# Patient Record
Sex: FEMALE | Race: WHITE | NOT HISPANIC OR LATINO | Employment: FULL TIME | URBAN - NONMETROPOLITAN AREA
[De-identification: names, ages, dates, MRNs, and addresses within clinical notes are randomized per-mention and may not be internally consistent; named-entity substitution may affect disease eponyms.]

---

## 2017-11-20 ENCOUNTER — TRANSCRIBE ORDERS (OUTPATIENT)
Dept: ADMINISTRATIVE | Facility: HOSPITAL | Age: 42
End: 2017-11-20

## 2017-11-20 DIAGNOSIS — Z12.31 VISIT FOR SCREENING MAMMOGRAM: Primary | ICD-10-CM

## 2017-12-13 ENCOUNTER — HOSPITAL ENCOUNTER (OUTPATIENT)
Dept: MAMMOGRAPHY | Facility: HOSPITAL | Age: 42
Discharge: HOME | End: 2017-12-13
Admitting: NURSE PRACTITIONER

## 2017-12-13 DIAGNOSIS — Z12.31 VISIT FOR SCREENING MAMMOGRAM: ICD-10-CM

## 2017-12-13 PROCEDURE — 77067 SCR MAMMO BI INCL CAD: CPT | Performed by: RADIOLOGY

## 2017-12-13 PROCEDURE — 77063 BREAST TOMOSYNTHESIS BI: CPT | Performed by: RADIOLOGY

## 2017-12-13 PROCEDURE — G0202 SCR MAMMO BI INCL CAD: HCPCS

## 2017-12-13 PROCEDURE — 77063 BREAST TOMOSYNTHESIS BI: CPT

## 2017-12-14 ENCOUNTER — HOSPITAL ENCOUNTER (OUTPATIENT)
Dept: MAMMOGRAPHY | Facility: HOSPITAL | Age: 42
Discharge: HOME | End: 2017-12-14

## 2018-01-02 ENCOUNTER — HOSPITAL ENCOUNTER (OUTPATIENT)
Dept: ULTRASOUND IMAGING | Facility: HOSPITAL | Age: 43
Discharge: HOME | End: 2018-01-02
Admitting: NURSE PRACTITIONER

## 2018-01-02 DIAGNOSIS — R92.8 ABNORMAL MAMMOGRAM OF RIGHT BREAST: ICD-10-CM

## 2018-01-02 PROCEDURE — 76642 ULTRASOUND BREAST LIMITED: CPT | Performed by: RADIOLOGY

## 2018-01-02 PROCEDURE — 76642 ULTRASOUND BREAST LIMITED: CPT

## 2018-07-03 ENCOUNTER — HOSPITAL ENCOUNTER (OUTPATIENT)
Dept: MAMMOGRAPHY | Facility: HOSPITAL | Age: 43
Discharge: HOME | End: 2018-07-03
Admitting: RADIOLOGY

## 2018-07-03 DIAGNOSIS — Z09 FOLLOW-UP EXAM, 3-6 MONTHS SINCE PREVIOUS EXAM: ICD-10-CM

## 2018-07-03 PROCEDURE — 77065 DX MAMMO INCL CAD UNI: CPT

## 2018-07-03 PROCEDURE — G0279 TOMOSYNTHESIS, MAMMO: HCPCS

## 2018-07-03 PROCEDURE — 77065 DX MAMMO INCL CAD UNI: CPT | Performed by: RADIOLOGY

## 2018-07-03 PROCEDURE — 77061 BREAST TOMOSYNTHESIS UNI: CPT | Performed by: RADIOLOGY

## 2018-07-11 ENCOUNTER — OFFICE VISIT (OUTPATIENT)
Dept: SURGERY | Facility: CLINIC | Age: 43
End: 2018-07-11

## 2018-07-11 VITALS
HEIGHT: 63 IN | DIASTOLIC BLOOD PRESSURE: 81 MMHG | HEART RATE: 66 BPM | WEIGHT: 121.4 LBS | SYSTOLIC BLOOD PRESSURE: 118 MMHG | BODY MASS INDEX: 21.51 KG/M2

## 2018-07-11 DIAGNOSIS — N63.0 BREAST MASS: Primary | ICD-10-CM

## 2018-07-11 DIAGNOSIS — Z80.3 FAMILY HISTORY OF BREAST CANCER: ICD-10-CM

## 2018-07-11 DIAGNOSIS — N64.4 BREAST PAIN: ICD-10-CM

## 2018-07-11 PROCEDURE — 76642 ULTRASOUND BREAST LIMITED: CPT | Performed by: SURGERY

## 2018-07-11 PROCEDURE — 99203 OFFICE O/P NEW LOW 30 MIN: CPT | Performed by: SURGERY

## 2018-07-11 NOTE — PROGRESS NOTES
"Subjective   Radha Walker is a 43 y.o. female here today for spot in right breast and under arm [facility].    History of Present Illness  Ms. Walker was seen in the office today for breast pain and an abnormal mammogram of the right breast demonstrating a stable breast mass.  This is a 43-year-old female who presents with a six-month history of localized right breast soreness.  She does not appreciate a definite mass and she denies nipple discharge.  Workup included a diagnostic right breast mammogram which demonstrated benign appearing mass in the upper outer quadrant which on ultrasound appeared to be a lymph node.  The patient denies a prior history of breast biopsy or cyst aspiration.  As far as risk factors go, Radha was 16 at the time that she had her first child, with an onset of menses at age 14.  Family history is positive for breast cancer the patient's mother in her 30s and a paternal aunt with breast cancer in her 30s.  The patient was postmenopausal in 2015 and has never been on hormone replacement therapy.  Allergies   Allergen Reactions   • Rocephin [Ceftriaxone] Swelling   • Penicillins Rash   • Red Dye Rash     No current outpatient prescriptions on file.     No current facility-administered medications for this visit.      History reviewed. No pertinent past medical history.  Past Surgical History:   Procedure Laterality Date   • HYSTERECTOMY       Review of Systems  General: fever  Integumentary: negative  Eyes: discharge from eyes  ENT: hoarseness  Respiratory: negative  Gastrointestinal: negative  Cardiovascular: negative  Neurological: numbness  Psychiatric: anxiety and mood swings  Hematologic/Lymphatic: easy bruising  Genitourinary: negative  Musculoskeletal: sore muscles  Endocrine: hot flashes  Breasts: breast pain        Objective   /81 (BP Location: Left arm, Patient Position: Sitting)   Pulse 66   Ht 160 cm (63\")   Wt 55.1 kg (121 lb 6.4 oz)   BMI " 21.51 kg/m²   Physical Exam  General:  This is a WD WN white female in no acute distress  Vital signs stable, afebrile  HEENT exam:  WNL. Sclera are anicteric.  EOMI  Neck:  supple, FROM.  No JVD.  Trachea midline.  No supraclavicular adenopathy  Lungs:  Respiratory effort normal. Auscultation: Clear, without wheezes, rhonchi, rales  Heart:  Regular rate and rhythm, without murmur, gallop, rub.  No pedal edema  Breasts: On visual inspection the breasts are symmetrical.  Examination of the right breast demonstrates a subtle palpable right breast mass in the area of her pain.  This corresponds to the area on mammogram..  Examination of the left breast demonstrates no discrete mass, skin change, or axillary adenopathy.  Abdomen: Nontender, without hepatosplenomegaly  Musculoskeletal:  muscle strength/tone is normal.    Psyc:  alert, oriented x 3.  Mood and affect are appropriate  skin:  Warm with good turgor.  Without rash or lesion  extremities:  Examination of the extremities revealed no cyanosis, clubbing or edema.    Results/Data  Mammogram reports and images were reviewed and I with the assessment  Procedures   Limited right Breast Ultrasound    Indication:  Right breast pain, history of abnormal right breast ultrasound    Description:  With use of the 12.5 MHz transducer the area of clinical concern was scanned in multiple planes.    Findings:  In the area patient's tenderness and subtle palpable finding is the area which was described as an enlarged normal lymph node    Impression:  Patient's palpable abnormality corresponds to mammogram and ultrasound finding    Recommendation:  Followup as clinically indicated      Assessment/Plan   Right breast pain corresponding to enlarged lymph node in the 10 o'clock position of the right breast    Proceed with right breast excisional biopsy with ultrasound localization       Discussion/Summary    Patient's Body mass index is 21.51 kg/m². BMI is below normal parameters.  Recommendations include: no follow-up required.       Errors in dictation may reflect use of voice recognition software and not all errors in transcription may have been detected prior to signing.    No future appointments.

## 2018-07-12 PROBLEM — N63.0 BREAST MASS: Status: ACTIVE | Noted: 2018-07-12

## 2018-07-12 PROBLEM — N64.4 BREAST PAIN: Status: ACTIVE | Noted: 2018-07-12

## 2018-07-12 RX ORDER — CLINDAMYCIN PHOSPHATE 900 MG/50ML
900 INJECTION INTRAVENOUS ONCE
Status: CANCELLED | OUTPATIENT
Start: 2018-07-23 | End: 2018-07-23

## 2018-07-15 PROBLEM — Z80.3 FAMILY HISTORY OF BREAST CANCER: Status: ACTIVE | Noted: 2018-07-15

## 2018-07-15 NOTE — H&P
"Subjective   Radha Walker is a 43 y.o. female here today for spot in right breast and under arm [facility].    History of Present Illness  Ms. Walker was seen in the office today for breast pain and an abnormal mammogram of the right breast demonstrating a stable breast mass.  This is a 43-year-old female who presents with a six-month history of localized right breast soreness.  She does not appreciate a definite mass and she denies nipple discharge.  Workup included a diagnostic right breast mammogram which demonstrated benign appearing mass in the upper outer quadrant which on ultrasound appeared to be a lymph node.  The patient denies a prior history of breast biopsy or cyst aspiration.  As far as risk factors go, Rdaha was 16 at the time that she had her first child, with an onset of menses at age 14.  Family history is positive for breast cancer the patient's mother in her 30s and a paternal aunt with breast cancer in her 30s.  The patient was postmenopausal in 2015 and has never been on hormone replacement therapy.  Allergies   Allergen Reactions   • Rocephin [Ceftriaxone] Swelling   • Penicillins Rash   • Red Dye Rash     No current outpatient prescriptions on file.     No current facility-administered medications for this visit.      History reviewed. No pertinent past medical history.  Past Surgical History:   Procedure Laterality Date   • HYSTERECTOMY       Review of Systems  General: fever  Integumentary: negative  Eyes: discharge from eyes  ENT: hoarseness  Respiratory: negative  Gastrointestinal: negative  Cardiovascular: negative  Neurological: numbness  Psychiatric: anxiety and mood swings  Hematologic/Lymphatic: easy bruising  Genitourinary: negative  Musculoskeletal: sore muscles  Endocrine: hot flashes  Breasts: breast pain        Objective   /81 (BP Location: Left arm, Patient Position: Sitting)   Pulse 66   Ht 160 cm (63\")   Wt 55.1 kg (121 lb 6.4 oz)   BMI " 21.51 kg/m²    Physical Exam  General:  This is a WD WN white female in no acute distress  Vital signs stable, afebrile  HEENT exam:  WNL. Sclera are anicteric.  EOMI  Neck:  supple, FROM.  No JVD.  Trachea midline.  No supraclavicular adenopathy  Lungs:  Respiratory effort normal. Auscultation: Clear, without wheezes, rhonchi, rales  Heart:  Regular rate and rhythm, without murmur, gallop, rub.  No pedal edema  Breasts: On visual inspection the breasts are symmetrical.  Examination of the right breast demonstrates a subtle palpable right breast mass in the area of her pain.  This corresponds to the area on mammogram..  Examination of the left breast demonstrates no discrete mass, skin change, or axillary adenopathy.  Abdomen: Nontender, without hepatosplenomegaly  Musculoskeletal:  muscle strength/tone is normal.    Psyc:  alert, oriented x 3.  Mood and affect are appropriate  skin:  Warm with good turgor.  Without rash or lesion  extremities:  Examination of the extremities revealed no cyanosis, clubbing or edema.    Results/Data  Mammogram reports and images were reviewed and I with the assessment  Procedures   Limited right Breast Ultrasound    Indication:  Right breast pain, history of abnormal right breast ultrasound    Description:  With use of the 12.5 MHz transducer the area of clinical concern was scanned in multiple planes.    Findings:  In the area patient's tenderness and subtle palpable finding is the area which was described as an enlarged normal lymph node    Impression:  Patient's palpable abnormality corresponds to mammogram and ultrasound finding    Recommendation:  Followup as clinically indicated      Assessment/Plan   Right breast pain corresponding to enlarged lymph node in the 10 o'clock position of the right breast    Proceed with right breast excisional biopsy with ultrasound localization       Discussion/Summary    Patient's Body mass index is 21.51 kg/m². BMI is below normal parameters.  Recommendations include: no follow-up required.       Errors in dictation may reflect use of voice recognition software and not all errors in transcription may have been detected prior to signing.    No future appointments.  This document has been electronically signed by Tamela THOMPSON MD on July 15, 2018 4:31 PM

## 2018-07-16 ENCOUNTER — TELEPHONE (OUTPATIENT)
Dept: SURGERY | Facility: CLINIC | Age: 43
End: 2018-07-16

## 2018-07-18 ENCOUNTER — TELEPHONE (OUTPATIENT)
Dept: SURGERY | Facility: CLINIC | Age: 43
End: 2018-07-18

## 2018-07-18 NOTE — TELEPHONE ENCOUNTER
Called twice and no answer to let patient know about her PAT, and I left a message giving her both office numbers

## 2018-07-20 ENCOUNTER — TELEPHONE (OUTPATIENT)
Dept: SURGERY | Facility: CLINIC | Age: 43
End: 2018-07-20

## 2018-07-20 NOTE — TELEPHONE ENCOUNTER
Called patient to see why she hadn't shown up for her PAT yesterday, and she said that she had a lot going on with her mom, and she didn't want to have the surgery right now, and want to cancel until further notice

## 2019-02-25 ENCOUNTER — OFFICE VISIT (OUTPATIENT)
Dept: SURGERY | Facility: CLINIC | Age: 44
End: 2019-02-25

## 2019-02-25 VITALS — HEIGHT: 63 IN | BODY MASS INDEX: 20.2 KG/M2 | WEIGHT: 114 LBS

## 2019-02-25 DIAGNOSIS — N63.0 BREAST MASS: Primary | ICD-10-CM

## 2019-02-25 PROCEDURE — 99406 BEHAV CHNG SMOKING 3-10 MIN: CPT | Performed by: SURGERY

## 2019-02-25 PROCEDURE — 99214 OFFICE O/P EST MOD 30 MIN: CPT | Performed by: SURGERY

## 2019-02-25 RX ORDER — CLINDAMYCIN PHOSPHATE 900 MG/50ML
900 INJECTION INTRAVENOUS ONCE
Status: CANCELLED | OUTPATIENT
Start: 2019-03-07 | End: 2019-02-25

## 2019-02-25 NOTE — PROGRESS NOTES
Subjective   Radha Walker is a 43 y.o. female is being seen for consultation today at the request of Jeannie Nguyen APRN    Radha Walker is a 43 y.o. 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  This causes pain that radiates to the axilla.  The patient has no family history of breast cancer.  Biopsy-proven fibroadenoma in this region.  It is grown from 1.5-1.9 cm over the course of its evaluation.  The patient has discomfort when she has her arm resting down on the location.  This is easily mobile with no overlying skin changes nipple discharge or concerning features.        Past Medical History:   Diagnosis Date   • Migraines        Family History   Problem Relation Age of Onset   • Breast cancer Mother    • Cancer Mother         breast   • Breast cancer Paternal Aunt    • Cancer Paternal Aunt         breast   • Diabetes Paternal Aunt    • Cancer Father    • Cancer Brother    • Diabetes Paternal Grandmother        Social History     Socioeconomic History   • Marital status: Single     Spouse name: Not on file   • Number of children: Not on file   • Years of education: Not on file   • Highest education level: Not on file   Social Needs   • Financial resource strain: Not on file   • Food insecurity - worry: Not on file   • Food insecurity - inability: Not on file   • Transportation needs - medical: Not on file   • Transportation needs - non-medical: Not on file   Occupational History   • Not on file   Tobacco Use   • Smoking status: Current Every Day Smoker     Packs/day: 0.25   • Smokeless tobacco: Never Used   • Tobacco comment: cessation discussed    Substance and Sexual Activity   • Alcohol use: No   • Drug use: No   • Sexual activity: No   Other Topics Concern   • Not on file   Social History Narrative   • Not on file       Past Surgical History:   Procedure Laterality Date   •  SECTION     • HYSTERECTOMY     • HYSTERECTOMY     • LAPAROSCOPIC GASTROSTOMY     •  "TUBAL ABDOMINAL LIGATION         Review of Systems   Constitutional: Negative for activity change, appetite change, chills and fever.   HENT: Negative for sore throat and trouble swallowing.    Eyes: Negative for visual disturbance.   Respiratory: Negative for cough and shortness of breath.    Cardiovascular: Negative for chest pain and palpitations.   Gastrointestinal: Negative for abdominal distention, abdominal pain, blood in stool, constipation, diarrhea, nausea and vomiting.   Endocrine: Negative for cold intolerance and heat intolerance.   Genitourinary: Negative for dysuria.   Musculoskeletal: Negative for joint swelling.   Skin: Negative for color change, rash and wound.   Allergic/Immunologic: Negative for immunocompromised state.   Neurological: Negative for dizziness, seizures, weakness and headaches.   Hematological: Negative for adenopathy. Does not bruise/bleed easily.   Psychiatric/Behavioral: Negative for agitation and confusion.         Ht 160 cm (63\")   Wt 51.7 kg (114 lb)   LMP  (Exact Date)   BMI 20.19 kg/m²   Objective   Physical Exam   Constitutional: She is oriented to person, place, and time. She appears well-developed and well-nourished.   HENT:   Head: Normocephalic and atraumatic.   Mouth/Throat: Mucous membranes are normal.   Eyes: Conjunctivae and EOM are normal. Pupils are equal, round, and reactive to light. No scleral icterus.   Neck: Neck supple. No JVD present. No tracheal deviation present. No thyromegaly present.   Cardiovascular: Normal rate and regular rhythm. Exam reveals no gallop and no friction rub.   No murmur heard.  Pulmonary/Chest: Effort normal and breath sounds normal. She exhibits no mass, no tenderness and no retraction. Right breast exhibits no mass, no nipple discharge, no skin change and no tenderness. Left breast exhibits no mass, no nipple discharge, no skin change and no tenderness. Breasts are symmetrical.   Right axillary tail there is a firm mobile mass " that is somewhat tender to palpation.  The patient has dense breasts with multiple small firm areas but this stands out as a distinct mass.       Abdominal: Soft. Bowel sounds are normal. She exhibits no distension and no mass. There is no splenomegaly or hepatomegaly. There is no tenderness. No hernia.   Musculoskeletal: Normal range of motion. She exhibits no deformity.   Lymphadenopathy:     She has no cervical adenopathy.     She has no axillary adenopathy.   Neurological: She is alert and oriented to person, place, and time.   Skin: Skin is warm and dry. No rash noted.   Psychiatric: She has a normal mood and affect.   Vitals reviewed.            Assessment   Radha was seen today for right breast mass.    Diagnoses and all orders for this visit:    Breast mass  -     Case Request; Standing  -     clindamycin (CLEOCIN) 900 mg in dextrose (D5W) 5 % 100 mL IVPB; Infuse 900 mg into a venous catheter 1 (One) Time.  -     Case Request    Other orders  -     Follow anesthesia standing orders.  -     Provide NPO Instructions to Patient; Future  -     Follow anesthesia standing orders.; Standing  -     Verify NPO Status; Standing  -     Obtain informed consent; Standing  -     SCD (sequential compression device)- to be placed on patient in Pre-op; Standing  -     Verify / Perform Chlorhexidine Skin Prep if Indicated (If Not Already Completed); Standing      Radha Walker is a 43 y.o. female with growing fibroadenoma the upper outer breast causing pain.  Risks and benefits of the surgery been discussed with the patient she will proceed with wire localized lumpectomy for the region.    Patient's Body mass index is 20.19 kg/m². BMI is within normal parameters. No follow-up required..    I advised Radha of the risks of continuing to use tobacco, and I provided her with tobacco cessation educational materials in the After Visit Summary.     During this visit, I spent 5 minutes counseling the patient regarding tobacco  cessation.

## 2019-02-26 DIAGNOSIS — N63.10 BREAST MASS, RIGHT: Primary | ICD-10-CM

## 2019-03-04 ENCOUNTER — APPOINTMENT (OUTPATIENT)
Dept: PREADMISSION TESTING | Facility: HOSPITAL | Age: 44
End: 2019-03-04

## 2019-03-04 LAB
ANION GAP SERPL CALCULATED.3IONS-SCNC: 6.9 MMOL/L (ref 3.6–11.2)
BUN BLD-MCNC: 11 MG/DL (ref 7–21)
BUN/CREAT SERPL: 15.9 (ref 7–25)
CALCIUM SPEC-SCNC: 9.6 MG/DL (ref 7.7–10)
CHLORIDE SERPL-SCNC: 107 MMOL/L (ref 99–112)
CO2 SERPL-SCNC: 27.1 MMOL/L (ref 24.3–31.9)
CREAT BLD-MCNC: 0.69 MG/DL (ref 0.43–1.29)
DEPRECATED RDW RBC AUTO: 45.2 FL (ref 37–54)
ERYTHROCYTE [DISTWIDTH] IN BLOOD BY AUTOMATED COUNT: 13.7 % (ref 11.5–14.5)
GFR SERPL CREATININE-BSD FRML MDRD: 92 ML/MIN/1.73
GLUCOSE BLD-MCNC: 85 MG/DL (ref 70–110)
HCT VFR BLD AUTO: 37 % (ref 37–47)
HGB BLD-MCNC: 12.1 G/DL (ref 12–16)
MCH RBC QN AUTO: 30.1 PG (ref 27–33)
MCHC RBC AUTO-ENTMCNC: 32.7 G/DL (ref 33–37)
MCV RBC AUTO: 92 FL (ref 80–94)
OSMOLALITY SERPL CALC.SUM OF ELEC: 279.9 MOSM/KG (ref 273–305)
PLATELET # BLD AUTO: 221 10*3/MM3 (ref 130–400)
PMV BLD AUTO: 11.7 FL (ref 6–10)
POTASSIUM BLD-SCNC: 4.1 MMOL/L (ref 3.5–5.3)
RBC # BLD AUTO: 4.02 10*6/MM3 (ref 4.2–5.4)
SODIUM BLD-SCNC: 141 MMOL/L (ref 135–153)
WBC NRBC COR # BLD: 6.51 10*3/MM3 (ref 4.5–12.5)

## 2019-03-04 PROCEDURE — 80048 BASIC METABOLIC PNL TOTAL CA: CPT | Performed by: SURGERY

## 2019-03-04 PROCEDURE — 36415 COLL VENOUS BLD VENIPUNCTURE: CPT

## 2019-03-04 PROCEDURE — 85027 COMPLETE CBC AUTOMATED: CPT | Performed by: SURGERY

## 2019-03-04 NOTE — DISCHARGE INSTRUCTIONS
Educational brochure given to patient regarding pain control and mindful breathing.    TAKE the following medications the morning of surgery:  All heart or blood pressure medications    Please discontinue all blood thinners and anticoagulants (except aspirin) prior to surgery as per your surgeon and cardiologist instructions.  Aspirin may be continued up to the day prior to surgery.    HOLD all diabetic medications the morning of surgery as order by physician.    Please follow instructions on use of prep cloths provided by nurse. Return instruction sheet with stickers attached to pre-op nurse on day of surgery.    Arrival time for surgery on 3/7/2019 is 0800 am.      General Instructions:  • Do NOT eat or drink after midnight 3/6/2019 which includes water, mints, or gum.  • You may brush your teeth. Dental appliances that are removable must be taken out day of surgery.  • Do NOT smoke, chew tobacco, or drink alcohol within 24 hours prior to surgery.  • Bring medications in original bottles, any inhalers and if applicable your C-PAP/BI-PAP machine  • Bring any papers given to you in the doctor’s office  • Wear clean, comfortable clothes and socks  • Do NOT wear contact lenses or make-up or dark nail polish.  Bring a case for your glasses if applicable.  • Bring crutches or walker if applicable  • Leave all other valuables and jewelry at home  • If you were given a blood bank armband, continue to wear it until discharged.    Preventing a Surgical Site Infection:  • Shower the night before surgery (unless instructed otherwise) using a fresh bar of anti-bacterial soap (such as Dial) and clean washcloth.  Dry with a clean towel and dress in clean clothing.  • For 2 to 3 days before surgery, avoid shaving with a razor near where you will have surgery because the razor can irritate skin and make it easier to develop an infection.  Ask your surgeon if you will be receiving antibiotics prior to surgery.  • Make sure you,  your family, and all healthcare providers clean their hands with soap and water or an alcohol-based hand  before caring for you or your wound.  • If at all possible, quit smoking as many days before surgery as you can.    Day of Surgery:  Upon arrival, a pre-op nurse and anesthesiologist will review your health history, obtain vital signs, and answer questions you may have.  The only belongings needed at this time will be your home medications and if applicable you C-PAP/BI-PAP machine.  If you are staying overnight, your family can leave the rest of your belongings in the car and bring them to your room later.  A pre-op nurse will start an IV and you may receive medication in preparation for surgery.  Due to patient privacy and limited space, only one member of your family will be able to accompany you in the pre-op area.  While you are in surgery your family should notify the waiting room  if they leave the waiting room area and provide a contact number.  Please be aware that surgery does come with discomfort.  We want to make every effort to control your discomfort so please discuss any uncontrolled symptoms with your nurse.  Your doctor will most likely have prescribed pain medications.  If you are going home after surgery you will receive individualized written care instructions before being discharged.  A responsible adult must drive you to and from the hospital on the day of surgery and stay with you for 24 hours.  If you are staying overnight following surgery, you will be transported to your hospital room following the recovery period.

## 2019-03-07 ENCOUNTER — ANESTHESIA (OUTPATIENT)
Dept: PERIOP | Facility: HOSPITAL | Age: 44
End: 2019-03-07

## 2019-03-07 ENCOUNTER — HOSPITAL ENCOUNTER (OUTPATIENT)
Dept: MAMMOGRAPHY | Facility: HOSPITAL | Age: 44
Setting detail: HOSPITAL OUTPATIENT SURGERY
Discharge: HOME | End: 2019-03-07

## 2019-03-07 ENCOUNTER — ANESTHESIA EVENT (OUTPATIENT)
Dept: PERIOP | Facility: HOSPITAL | Age: 44
End: 2019-03-07

## 2019-03-07 ENCOUNTER — HOSPITAL ENCOUNTER (OUTPATIENT)
Facility: HOSPITAL | Age: 44
Setting detail: HOSPITAL OUTPATIENT SURGERY
Discharge: HOME | End: 2019-03-07
Attending: SURGERY | Admitting: SURGERY

## 2019-03-07 VITALS
OXYGEN SATURATION: 98 % | HEART RATE: 56 BPM | WEIGHT: 115 LBS | RESPIRATION RATE: 16 BRPM | SYSTOLIC BLOOD PRESSURE: 105 MMHG | DIASTOLIC BLOOD PRESSURE: 64 MMHG | TEMPERATURE: 98 F | BODY MASS INDEX: 20.38 KG/M2 | HEIGHT: 63 IN

## 2019-03-07 DIAGNOSIS — N63.0 BREAST MASS: ICD-10-CM

## 2019-03-07 DIAGNOSIS — N63.10 BREAST MASS, RIGHT: ICD-10-CM

## 2019-03-07 PROCEDURE — 25010000002 ONDANSETRON PER 1 MG: Performed by: NURSE ANESTHETIST, CERTIFIED REGISTERED

## 2019-03-07 PROCEDURE — 25010000002 FENTANYL CITRATE (PF) 100 MCG/2ML SOLUTION: Performed by: NURSE ANESTHETIST, CERTIFIED REGISTERED

## 2019-03-07 PROCEDURE — 19125 EXCISION BREAST LESION: CPT | Performed by: SURGERY

## 2019-03-07 PROCEDURE — 25010000002 PHENYLEPHRINE PER 1 ML: Performed by: NURSE ANESTHETIST, CERTIFIED REGISTERED

## 2019-03-07 PROCEDURE — 19281 PERQ DEVICE BREAST 1ST IMAG: CPT | Performed by: RADIOLOGY

## 2019-03-07 PROCEDURE — 25010000002 MIDAZOLAM PER 1 MG: Performed by: NURSE ANESTHETIST, CERTIFIED REGISTERED

## 2019-03-07 PROCEDURE — 25010000002 KETOROLAC TROMETHAMINE PER 15 MG: Performed by: NURSE ANESTHETIST, CERTIFIED REGISTERED

## 2019-03-07 RX ORDER — MAGNESIUM HYDROXIDE 1200 MG/15ML
LIQUID ORAL AS NEEDED
Status: DISCONTINUED | OUTPATIENT
Start: 2019-03-07 | End: 2019-03-07 | Stop reason: HOSPADM

## 2019-03-07 RX ORDER — FAMOTIDINE 10 MG/ML
INJECTION, SOLUTION INTRAVENOUS AS NEEDED
Status: DISCONTINUED | OUTPATIENT
Start: 2019-03-07 | End: 2019-03-07 | Stop reason: SURG

## 2019-03-07 RX ORDER — SODIUM CHLORIDE 0.9 % (FLUSH) 0.9 %
3 SYRINGE (ML) INJECTION EVERY 12 HOURS SCHEDULED
Status: DISCONTINUED | OUTPATIENT
Start: 2019-03-07 | End: 2019-03-07 | Stop reason: HOSPADM

## 2019-03-07 RX ORDER — BUPIVACAINE HYDROCHLORIDE AND EPINEPHRINE 2.5; 5 MG/ML; UG/ML
INJECTION, SOLUTION EPIDURAL; INFILTRATION; INTRACAUDAL; PERINEURAL AS NEEDED
Status: DISCONTINUED | OUTPATIENT
Start: 2019-03-07 | End: 2019-03-07 | Stop reason: HOSPADM

## 2019-03-07 RX ORDER — FENTANYL CITRATE 50 UG/ML
INJECTION, SOLUTION INTRAMUSCULAR; INTRAVENOUS AS NEEDED
Status: DISCONTINUED | OUTPATIENT
Start: 2019-03-07 | End: 2019-03-07 | Stop reason: SURG

## 2019-03-07 RX ORDER — ONDANSETRON 2 MG/ML
4 INJECTION INTRAMUSCULAR; INTRAVENOUS ONCE AS NEEDED
Status: DISCONTINUED | OUTPATIENT
Start: 2019-03-07 | End: 2019-03-07 | Stop reason: HOSPADM

## 2019-03-07 RX ORDER — KETOROLAC TROMETHAMINE 30 MG/ML
INJECTION, SOLUTION INTRAMUSCULAR; INTRAVENOUS AS NEEDED
Status: DISCONTINUED | OUTPATIENT
Start: 2019-03-07 | End: 2019-03-07 | Stop reason: SURG

## 2019-03-07 RX ORDER — FENTANYL CITRATE 50 UG/ML
50 INJECTION, SOLUTION INTRAMUSCULAR; INTRAVENOUS
Status: DISCONTINUED | OUTPATIENT
Start: 2019-03-07 | End: 2019-03-07 | Stop reason: HOSPADM

## 2019-03-07 RX ORDER — CLINDAMYCIN PHOSPHATE 900 MG/50ML
900 INJECTION INTRAVENOUS ONCE
Status: COMPLETED | OUTPATIENT
Start: 2019-03-07 | End: 2019-03-07

## 2019-03-07 RX ORDER — LIDOCAINE HYDROCHLORIDE 20 MG/ML
INJECTION, SOLUTION INFILTRATION; PERINEURAL AS NEEDED
Status: DISCONTINUED | OUTPATIENT
Start: 2019-03-07 | End: 2019-03-07 | Stop reason: SURG

## 2019-03-07 RX ORDER — SODIUM CHLORIDE, SODIUM LACTATE, POTASSIUM CHLORIDE, CALCIUM CHLORIDE 600; 310; 30; 20 MG/100ML; MG/100ML; MG/100ML; MG/100ML
125 INJECTION, SOLUTION INTRAVENOUS CONTINUOUS
Status: DISCONTINUED | OUTPATIENT
Start: 2019-03-07 | End: 2019-03-07 | Stop reason: HOSPADM

## 2019-03-07 RX ORDER — OXYCODONE HYDROCHLORIDE AND ACETAMINOPHEN 5; 325 MG/1; MG/1
1 TABLET ORAL ONCE AS NEEDED
Status: DISCONTINUED | OUTPATIENT
Start: 2019-03-07 | End: 2019-03-07 | Stop reason: HOSPADM

## 2019-03-07 RX ORDER — IPRATROPIUM BROMIDE AND ALBUTEROL SULFATE 2.5; .5 MG/3ML; MG/3ML
3 SOLUTION RESPIRATORY (INHALATION) ONCE AS NEEDED
Status: DISCONTINUED | OUTPATIENT
Start: 2019-03-07 | End: 2019-03-07 | Stop reason: HOSPADM

## 2019-03-07 RX ORDER — ONDANSETRON 2 MG/ML
INJECTION INTRAMUSCULAR; INTRAVENOUS AS NEEDED
Status: DISCONTINUED | OUTPATIENT
Start: 2019-03-07 | End: 2019-03-07 | Stop reason: SURG

## 2019-03-07 RX ORDER — HYDROCODONE BITARTRATE AND ACETAMINOPHEN 5; 325 MG/1; MG/1
1 TABLET ORAL EVERY 6 HOURS PRN
Qty: 10 TABLET | Refills: 0 | Status: SHIPPED | OUTPATIENT
Start: 2019-03-07

## 2019-03-07 RX ORDER — MIDAZOLAM HYDROCHLORIDE 1 MG/ML
INJECTION INTRAMUSCULAR; INTRAVENOUS AS NEEDED
Status: DISCONTINUED | OUTPATIENT
Start: 2019-03-07 | End: 2019-03-07 | Stop reason: SURG

## 2019-03-07 RX ORDER — SODIUM CHLORIDE 0.9 % (FLUSH) 0.9 %
3-10 SYRINGE (ML) INJECTION AS NEEDED
Status: DISCONTINUED | OUTPATIENT
Start: 2019-03-07 | End: 2019-03-07 | Stop reason: HOSPADM

## 2019-03-07 RX ORDER — MEPERIDINE HYDROCHLORIDE 25 MG/ML
12.5 INJECTION INTRAMUSCULAR; INTRAVENOUS; SUBCUTANEOUS
Status: DISCONTINUED | OUTPATIENT
Start: 2019-03-07 | End: 2019-03-07 | Stop reason: HOSPADM

## 2019-03-07 RX ADMIN — KETOROLAC TROMETHAMINE 30 MG: 30 INJECTION, SOLUTION INTRAMUSCULAR; INTRAVENOUS at 11:01

## 2019-03-07 RX ADMIN — ONDANSETRON 4 MG: 2 INJECTION, SOLUTION INTRAMUSCULAR; INTRAVENOUS at 10:42

## 2019-03-07 RX ADMIN — PHENYLEPHRINE HYDROCHLORIDE 100 MCG: 10 INJECTION, SOLUTION INTRAMUSCULAR; INTRAVENOUS; SUBCUTANEOUS at 10:55

## 2019-03-07 RX ADMIN — FAMOTIDINE 20 MG: 10 INJECTION, SOLUTION INTRAVENOUS at 10:42

## 2019-03-07 RX ADMIN — CLINDAMYCIN PHOSPHATE 900 MG: 900 INJECTION, SOLUTION INTRAVENOUS at 10:46

## 2019-03-07 RX ADMIN — SODIUM CHLORIDE, POTASSIUM CHLORIDE, SODIUM LACTATE AND CALCIUM CHLORIDE 125 ML/HR: 600; 310; 30; 20 INJECTION, SOLUTION INTRAVENOUS at 10:03

## 2019-03-07 RX ADMIN — FENTANYL CITRATE 50 MCG: 50 INJECTION INTRAMUSCULAR; INTRAVENOUS at 11:33

## 2019-03-07 RX ADMIN — LIDOCAINE HYDROCHLORIDE 60 MG: 20 INJECTION, SOLUTION INFILTRATION; PERINEURAL at 10:42

## 2019-03-07 RX ADMIN — PHENYLEPHRINE HYDROCHLORIDE 100 MCG: 10 INJECTION, SOLUTION INTRAMUSCULAR; INTRAVENOUS; SUBCUTANEOUS at 11:08

## 2019-03-07 RX ADMIN — SODIUM CHLORIDE, POTASSIUM CHLORIDE, SODIUM LACTATE AND CALCIUM CHLORIDE: 600; 310; 30; 20 INJECTION, SOLUTION INTRAVENOUS at 10:40

## 2019-03-07 RX ADMIN — MIDAZOLAM HYDROCHLORIDE 2 MG: 1 INJECTION, SOLUTION INTRAMUSCULAR; INTRAVENOUS at 10:42

## 2019-03-07 RX ADMIN — FENTANYL CITRATE 100 MCG: 50 INJECTION INTRAMUSCULAR; INTRAVENOUS at 10:42

## 2019-03-07 NOTE — ANESTHESIA PROCEDURE NOTES
Airway  Urgency: elective    Date/Time: 3/7/2019 11:02 AM  Airway not difficult    General Information and Staff    Patient location during procedure: OR  Anesthesiologist: Dwight Prescott MD  CRNA: Sheryl Cordero CRNA    Indications and Patient Condition  Indications for airway management: airway protection    Preoxygenated: yes      Final Airway Details  Final airway type: supraglottic airway      Successful airway: unique  Size 4    Number of attempts at approach: 1

## 2019-03-07 NOTE — ANESTHESIA PREPROCEDURE EVALUATION
Anesthesia Evaluation     Patient summary reviewed and Nursing notes reviewed   NPO Solid Status: > 8 hours  NPO Liquid Status: > 8 hours           Airway   Mallampati: II  TM distance: >3 FB  Neck ROM: full  no difficulty expected  Dental    (+) poor dentition, lower dentures and upper dentures    Pulmonary - negative pulmonary ROS   (+) decreased breath sounds,   Cardiovascular - negative cardio ROS    Rhythm: regular  Rate: normal        Neuro/Psych- negative ROS  GI/Hepatic/Renal/Endo - negative ROS     Musculoskeletal     (+) chronic pain,   Abdominal    Substance History - negative use     OB/GYN negative ob/gyn ROS         Other - negative ROS                       Anesthesia Plan    ASA 3     general     intravenous induction   Anesthetic plan, all risks, benefits, and alternatives have been provided, discussed and informed consent has been obtained with: patient.  Use of blood products discussed with patient .   Plan discussed with CRNA.

## 2019-03-07 NOTE — OP NOTE
BREAST BIOPSY WITH NEEDLE LOCALIZATION  Procedure Note    Radha Walker  3/7/2019    Pre-op Diagnosis:   Breast mass [N63.0]    Post-op Diagnosis:     Post-Op Diagnosis Codes:     * Breast mass [N63.0]    Procedure(s):  BREAST BIOPSY WITH NEEDLE LOCALIZATION    Surgeon(s):  Nahum Gonzalez MD    Anesthesia: Choice    Staff:   Circulator: Michell Andrews RN  Scrub Person: Nettie Faith  Assistant: Jamey Tadeo CSA    Findings: Successful removal of previously biopsied breast mass confirmed by specimen mammogram    Operative Procedure: Patient underwent preoperative wire localization.  X-ray was reviewed to aid in incisional planning.  The patient was taken operating room placed upon the operating table.  Bilateral retrogression devices were applied and LMA anesthesia was a .  The right breast was prepped and draped in usual sterile fashion.  Timeout procedure was performed.  A curvilinear incision was made surrounding the guidewire and subcutaneous flaps were raised circumferentially around the guidewire and ellipsed skin.  Dissection was carried down to the pectoralis musculature and all tissue surrounding the guidewire was removed.  Specimen mammogram was performed confirming the imaging abnormality, biopsy clip and guidewire were contained within the specimen.  The incision was irrigated with sterile water and all irrigant was suctioned free.  Hemostasis was achieved with cautery and the incision was closed in layers with absorbable suture.  Sure closed was applied to the incision the patient was awakened from anesthesia and taken to recovery.    Estimated Blood Loss: 25mL  Specimens:   Right breast mass              Drains: None    Grafts/Implants: None    Complications: None      Nahum Gonzalez MD     Date: 3/7/2019  Time: 1:29 PM

## 2019-03-07 NOTE — ANESTHESIA POSTPROCEDURE EVALUATION
Patient: Radha Walker    Procedure Summary     Date:  03/07/19 Room / Location:   [facility] /  [facility]    Anesthesia Start:  1043 Anesthesia Stop:  1117    Procedure:  BREAST BIOPSY WITH NEEDLE LOCALIZATION (Right Breast) Diagnosis:       Breast mass      (Breast mass [N63.0])    Surgeon:  Nahum Gonzalez MD Provider:  Dwight Prescott MD    Anesthesia Type:  general ASA Status:  3          Anesthesia Type: general  Last vitals  BP   107/62 (03/07/19 0931)   Temp   98.1 °F (36.7 °C) (03/07/19 0931)   Pulse   63 (03/07/19 0931)   Resp   17 (03/07/19 0931)     SpO2   100 % (03/07/19 0931)     Post Anesthesia Care and Evaluation    Patient location during evaluation: bedside  Patient participation: complete - patient participated  Level of consciousness: awake and alert  Pain score: 1  Pain management: adequate  Airway patency: patent  Anesthetic complications: No anesthetic complications  PONV Status: none  Cardiovascular status: acceptable  Respiratory status: acceptable  Hydration status: acceptable

## 2019-03-12 LAB
LAB AP CASE REPORT: NORMAL
PATH REPORT.FINAL DX SPEC: NORMAL

## 2019-03-15 ENCOUNTER — TELEPHONE (OUTPATIENT)
Dept: SURGERY | Facility: CLINIC | Age: 44
End: 2019-03-15

## 2019-03-15 NOTE — TELEPHONE ENCOUNTER
Patient called to ask if she could go back to work, I asked Dr Gonzalez and he said yes, just keep her post op appointment on the 25th of this month. Patient said she understood.

## 2019-03-25 ENCOUNTER — OFFICE VISIT (OUTPATIENT)
Dept: SURGERY | Facility: CLINIC | Age: 44
End: 2019-03-25

## 2019-03-25 VITALS
BODY MASS INDEX: 20.38 KG/M2 | WEIGHT: 115 LBS | HEIGHT: 63 IN | SYSTOLIC BLOOD PRESSURE: 120 MMHG | HEART RATE: 85 BPM | DIASTOLIC BLOOD PRESSURE: 74 MMHG

## 2019-03-25 DIAGNOSIS — N63.0 BREAST MASS: Primary | ICD-10-CM

## 2019-03-25 PROCEDURE — 99024 POSTOP FOLLOW-UP VISIT: CPT | Performed by: SURGERY

## 2019-03-27 NOTE — PROGRESS NOTES
Subjective   Radha Walker is a 43 y.o. female  is here today for follow-up.         Radha Walker is a 43 y.o. female here for follow up after right breast excisional biopsy and removal of fibroadenoma.  Final pathology is consistent with fibroadenoma without atypia or malignancy.  No high risk features or other abnormalities.  Her incision has healed well with no evidence of complication or recurrence.      Assessment     Radha was seen today for s/p breast biopsy.    Diagnoses and all orders for this visit:    Breast mass      Radha Walker is a 43 y.o. female doing well after removal of fibroadenoma of the right breast due to increasing size and tenderness.  No atypical features or malignancy identified.  The patient will obtain new right breast baseline mammogram and proceed with annual screening.  Follow-up as needed.

## 2019-08-08 DIAGNOSIS — Z80.3 FAMILY HISTORY OF BREAST CANCER: ICD-10-CM

## 2019-08-08 DIAGNOSIS — N63.10 BREAST MASS, RIGHT: Primary | ICD-10-CM

## 2019-08-08 DIAGNOSIS — Z98.890 HISTORY OF LUMPECTOMY OF RIGHT BREAST: ICD-10-CM

## 2021-01-15 ENCOUNTER — TRANSCRIBE ORDERS (OUTPATIENT)
Dept: LAB | Facility: HOSPITAL | Age: 46
End: 2021-01-15

## 2021-01-15 ENCOUNTER — HOSPITAL ENCOUNTER (OUTPATIENT)
Dept: GENERAL RADIOLOGY | Facility: HOSPITAL | Age: 46
Discharge: HOME | End: 2021-01-15
Admitting: FAMILY MEDICINE

## 2021-01-15 DIAGNOSIS — U07.1 CLINICAL DIAGNOSIS OF SEVERE ACUTE RESPIRATORY SYNDROME CORONAVIRUS 2 (SARS-COV-2) DISEASE: ICD-10-CM

## 2021-01-15 DIAGNOSIS — U07.1 CLINICAL DIAGNOSIS OF SEVERE ACUTE RESPIRATORY SYNDROME CORONAVIRUS 2 (SARS-COV-2) DISEASE: Primary | ICD-10-CM

## 2021-01-15 PROCEDURE — 71046 X-RAY EXAM CHEST 2 VIEWS: CPT | Performed by: RADIOLOGY

## 2021-01-15 PROCEDURE — 71046 X-RAY EXAM CHEST 2 VIEWS: CPT

## 2021-03-18 ENCOUNTER — BULK ORDERING (OUTPATIENT)
Dept: CASE MANAGEMENT | Facility: OTHER | Age: 46
End: 2021-03-18

## 2021-03-18 DIAGNOSIS — Z23 IMMUNIZATION DUE: ICD-10-CM

## 2021-04-26 ENCOUNTER — HOSPITAL ENCOUNTER (OUTPATIENT)
Dept: HOSPITAL 79 - LAB | Age: 46
End: 2021-04-26
Attending: FAMILY MEDICINE
Payer: COMMERCIAL

## 2021-04-26 DIAGNOSIS — R53.83: Primary | ICD-10-CM

## 2021-04-26 DIAGNOSIS — Z87.01: ICD-10-CM

## 2021-04-26 DIAGNOSIS — F17.200: ICD-10-CM

## 2021-04-26 LAB
BUN/CREATININE RATIO: 14 (ref 0–10)
HGB BLD-MCNC: 11.8 GM/DL (ref 12.3–15.3)
RED BLOOD COUNT: 3.98 M/UL (ref 4–5.1)
WHITE BLOOD COUNT: 6.4 K/UL (ref 4.5–11)

## 2021-04-28 LAB — VITAMIN D, 25-HYDROXY: 36.2 NG/ML (ref 30–100)

## 2021-07-21 ENCOUNTER — HOSPITAL ENCOUNTER (OUTPATIENT)
Dept: HOSPITAL 79 - LAB | Age: 46
End: 2021-07-21
Attending: FAMILY MEDICINE
Payer: COMMERCIAL

## 2021-07-21 DIAGNOSIS — D51.0: Primary | ICD-10-CM

## 2021-07-21 LAB
HGB BLD-MCNC: 12.7 GM/DL (ref 12.3–15.3)
RED BLOOD COUNT: 4.1 M/UL (ref 4–5.1)
WHITE BLOOD COUNT: 6.3 K/UL (ref 4.5–11)

## 2021-10-01 ENCOUNTER — HOSPITAL ENCOUNTER (OUTPATIENT)
Dept: HOSPITAL 79 - LAB | Age: 46
End: 2021-10-01
Attending: FAMILY MEDICINE
Payer: COMMERCIAL

## 2021-10-01 DIAGNOSIS — D51.0: Primary | ICD-10-CM

## 2021-10-01 LAB
HGB BLD-MCNC: 12.6 GM/DL (ref 12.3–15.3)
RED BLOOD COUNT: 4.01 M/UL (ref 4–5.1)
WHITE BLOOD COUNT: 6.7 K/UL (ref 4.5–11)

## 2022-03-21 ENCOUNTER — HOSPITAL ENCOUNTER (OUTPATIENT)
Dept: HOSPITAL 79 - LAB | Age: 47
End: 2022-03-21
Attending: FAMILY MEDICINE
Payer: COMMERCIAL

## 2022-03-21 DIAGNOSIS — R53.83: ICD-10-CM

## 2022-03-21 DIAGNOSIS — R06.00: Primary | ICD-10-CM

## 2022-03-21 LAB
BUN/CREATININE RATIO: 15 (ref 0–10)
HGB BLD-MCNC: 12.3 GM/DL (ref 12.3–15.3)
RED BLOOD COUNT: 4.03 M/UL (ref 4–5.1)
WHITE BLOOD COUNT: 6.9 K/UL (ref 4.5–11)

## 2022-03-22 LAB — VITAMIN D, 25-HYDROXY: 22.3 NG/ML (ref 30–100)

## 2022-04-14 ENCOUNTER — HOSPITAL ENCOUNTER (OUTPATIENT)
Dept: HOSPITAL 79 - HEART 5 | Age: 47
End: 2022-04-14
Attending: FAMILY MEDICINE
Payer: COMMERCIAL

## 2022-04-14 DIAGNOSIS — R94.2: ICD-10-CM

## 2022-04-14 DIAGNOSIS — R06.00: Primary | ICD-10-CM

## 2022-04-25 ENCOUNTER — HOSPITAL ENCOUNTER (OUTPATIENT)
Dept: HOSPITAL 79 - HEART 5 | Age: 47
End: 2022-04-25
Attending: FAMILY MEDICINE
Payer: COMMERCIAL

## 2022-04-25 DIAGNOSIS — R06.00: Primary | ICD-10-CM

## 2022-06-25 ENCOUNTER — HOSPITAL ENCOUNTER (EMERGENCY)
Dept: HOSPITAL 79 - ER1 | Age: 47
Discharge: HOME | End: 2022-06-25
Payer: COMMERCIAL

## 2022-06-25 DIAGNOSIS — F17.200: ICD-10-CM
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
## 2022-06-25 DIAGNOSIS — S93.601A: Primary | ICD-10-CM
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
## 2022-07-15 ENCOUNTER — HOSPITAL ENCOUNTER (OUTPATIENT)
Dept: HOSPITAL 79 - RAD | Age: 47
End: 2022-07-15
Attending: NURSE PRACTITIONER
Payer: COMMERCIAL

## 2022-07-15 DIAGNOSIS — M79.675: Primary | ICD-10-CM

## 2022-07-15 DIAGNOSIS — S92.425A: ICD-10-CM

## (undated) DEVICE — SKIN AFFIX SURG ADHESIVE 72/CS 0.55ML: Brand: MEDLINE

## (undated) DEVICE — DEV TRNSPEC

## (undated) DEVICE — HOLDER: Brand: DEROYAL

## (undated) DEVICE — GLV SURG SENSICARE W/ALOE PF LF 7.5 STRL

## (undated) DEVICE — SUT MNCRYL 4/0 PS2 18 IN

## (undated) DEVICE — APPL CHLORAPREP W/TINT 26ML ORNG

## (undated) DEVICE — DBD-DRAPE,LAP,CHOLE,W/TROUGHS,STERILE: Brand: MEDLINE

## (undated) DEVICE — PAD GRND REM POLYHESIVE A/ DISP

## (undated) DEVICE — PK BASIC 70

## (undated) DEVICE — ENCORE® LATEX MICRO SIZE 8, STERILE LATEX POWDER-FREE SURGICAL GLOVE: Brand: ENCORE